# Patient Record
Sex: FEMALE | Race: BLACK OR AFRICAN AMERICAN | Employment: OTHER | URBAN - METROPOLITAN AREA
[De-identification: names, ages, dates, MRNs, and addresses within clinical notes are randomized per-mention and may not be internally consistent; named-entity substitution may affect disease eponyms.]

---

## 2024-07-23 ENCOUNTER — HOSPITAL ENCOUNTER (EMERGENCY)
Age: 79
Discharge: HOME | End: 2024-07-23
Payer: COMMERCIAL

## 2024-07-23 ENCOUNTER — APPOINTMENT (OUTPATIENT)
Dept: GENERAL RADIOLOGY | Age: 79
End: 2024-07-23
Payer: COMMERCIAL

## 2024-07-23 VITALS
DIASTOLIC BLOOD PRESSURE: 58 MMHG | SYSTOLIC BLOOD PRESSURE: 108 MMHG | TEMPERATURE: 99.4 F | HEIGHT: 63 IN | RESPIRATION RATE: 16 BRPM | WEIGHT: 127 LBS | OXYGEN SATURATION: 92 % | HEART RATE: 76 BPM | BODY MASS INDEX: 22.5 KG/M2

## 2024-07-23 DIAGNOSIS — R09.89 CHOKING EPISODE: Primary | ICD-10-CM

## 2024-07-23 PROCEDURE — 99283 EMERGENCY DEPT VISIT LOW MDM: CPT

## 2024-07-23 PROCEDURE — 71046 X-RAY EXAM CHEST 2 VIEWS: CPT

## 2024-07-23 RX ORDER — ESCITALOPRAM OXALATE 10 MG/1
10 TABLET ORAL DAILY
COMMUNITY
Start: 2024-05-06

## 2024-07-23 RX ORDER — ATORVASTATIN CALCIUM 40 MG/1
40 TABLET, FILM COATED ORAL DAILY
COMMUNITY
Start: 2024-05-06 | End: 2025-05-06

## 2024-07-23 RX ORDER — PANTOPRAZOLE SODIUM 40 MG/1
40 TABLET, DELAYED RELEASE ORAL DAILY
COMMUNITY
Start: 2024-05-06 | End: 2025-05-06

## 2024-07-23 RX ORDER — METOPROLOL TARTRATE 50 MG/1
50 TABLET, FILM COATED ORAL EVERY 12 HOURS
COMMUNITY
Start: 2024-05-06 | End: 2024-11-02

## 2024-07-23 RX ORDER — DOXYCYCLINE HYCLATE 100 MG
100 TABLET ORAL 2 TIMES DAILY
Qty: 14 TABLET | Refills: 0 | Status: SHIPPED | OUTPATIENT
Start: 2024-07-23 | End: 2024-07-30

## 2024-07-23 NOTE — ED TRIAGE NOTES
Patient presents with persistent coughing for the past 10 mins, per daughter patient was eating some fries and chicken nuggets when she felt like some food went into her lungs and she has being coughing since then.     NAD noted during triage     Patient coughing during triage, history given by daughter     Patient has a history of strokes 3 weeks ago

## 2024-07-23 NOTE — ED NOTES
I have reviewed discharge instructions with the patient and daughter.  The patient and daughter verbalized understanding.    Patient left ED via Discharge Method: ambulatory to Home with daughter.    Opportunity for questions and clarification provided.       Patient given 0 scripts. Sent to patients preferred pharmacy         To continue your aftercare when you leave the hospital, you may receive an automated call from our care team to check in on how you are doing.  This is a free service and part of our promise to provide the best care and service to meet your aftercare needs.” If you have questions, or wish to unsubscribe from this service please call [phone].  Thank you for Choosing our [facility] Emergency Department.

## 2024-07-23 NOTE — DISCHARGE INSTRUCTIONS
States she had a choking episode you may have aspirated some into your lung we will place you on doxycycline twice a day to help possibly prevent any pneumonia.  Return to ER if any shortness of breath fever or wheezes.  See your primary care physician in 1 week for recheck

## 2024-07-24 NOTE — ED PROVIDER NOTES
Emergency Department Provider Note       PCP: No primary care provider on file.   Age: 78 y.o.   Sex: female     DISPOSITION Decision To Discharge 07/23/2024 06:05:31 PM       ICD-10-CM    1. Choking episode  R09.89           Medical Decision Making     78-year-old female to ER after choking on French fries just moments prior to arrival.  Patient was afebrile vital signs are stable PA lateral chest were negative for any obvious infiltrates but it is early.  Will err on the side of the breast and place on doxycycline twice a day for possible aspiration.  Daughter is advised to return to ER if any wheezing fever shortness of breath see primary care for recheck in 1 week for repeat chest x-ray     1 or more acute illnesses that pose a threat to life or bodily function.   Prescription drug management performed.  Shared medical decision making was utilized in creating the patients health plan today.    I independently ordered and reviewed each unique test.  I reviewed external records: provider visit note from PCP.     I interpreted the X-rays PA lateral chest negative.              History     78-year-old female brought to ER by daughter after she choked on some French fries just minutes prior to arrival.  Daughter states patient with history of recent stroke she has some aphasia but has no problems eating or drinking.  Daughter said she started coughing and could not stop.  She did not stop until she got to the ER waiting room.  She is in no distress          ROS     Review of Systems   Unable to perform ROS: Other        Physical Exam     Vitals signs and nursing note reviewed:  Vitals:    07/23/24 1641 07/23/24 1817   BP: (!) 143/64 (!) 108/58   Pulse: 84 76   Resp: 20 16   Temp: 99.4 °F (37.4 °C)    SpO2: 97% 92%   Weight: 57.6 kg (127 lb)    Height: 1.6 m (5' 3\")       Physical Exam  Vitals and nursing note reviewed.   Constitutional:       Appearance: Normal appearance. She is normal weight.   HENT:      Head: